# Patient Record
Sex: FEMALE | Race: WHITE | NOT HISPANIC OR LATINO | Employment: OTHER | URBAN - NONMETROPOLITAN AREA
[De-identification: names, ages, dates, MRNs, and addresses within clinical notes are randomized per-mention and may not be internally consistent; named-entity substitution may affect disease eponyms.]

---

## 2017-02-03 ENCOUNTER — OFFICE VISIT (OUTPATIENT)
Dept: CARDIOLOGY | Facility: CLINIC | Age: 82
End: 2017-02-03
Payer: MEDICARE

## 2017-02-03 VITALS
HEIGHT: 60 IN | HEART RATE: 70 BPM | BODY MASS INDEX: 38.28 KG/M2 | SYSTOLIC BLOOD PRESSURE: 100 MMHG | OXYGEN SATURATION: 95 % | DIASTOLIC BLOOD PRESSURE: 50 MMHG | WEIGHT: 195 LBS

## 2017-02-03 DIAGNOSIS — I10 ESSENTIAL HYPERTENSION, BENIGN: ICD-10-CM

## 2017-02-03 DIAGNOSIS — I35.0 NONRHEUMATIC AORTIC VALVE STENOSIS: ICD-10-CM

## 2017-02-03 DIAGNOSIS — E78.2 MIXED HYPERLIPIDEMIA: ICD-10-CM

## 2017-02-03 DIAGNOSIS — I50.32 CHRONIC DIASTOLIC HEART FAILURE (HCC): ICD-10-CM

## 2017-02-03 DIAGNOSIS — I25.10 ATHEROSCLEROSIS OF NATIVE CORONARY ARTERY OF NATIVE HEART WITHOUT ANGINA PECTORIS: ICD-10-CM

## 2017-02-03 PROCEDURE — G8484 FLU IMMUNIZE NO ADMIN: HCPCS | Performed by: INTERNAL MEDICINE

## 2017-02-03 PROCEDURE — 1101F PT FALLS ASSESS-DOCD LE1/YR: CPT | Mod: 8P | Performed by: INTERNAL MEDICINE

## 2017-02-03 PROCEDURE — 4040F PNEUMOC VAC/ADMIN/RCVD: CPT | Mod: 8P | Performed by: INTERNAL MEDICINE

## 2017-02-03 PROCEDURE — 1036F TOBACCO NON-USER: CPT | Performed by: INTERNAL MEDICINE

## 2017-02-03 PROCEDURE — G8432 DEP SCR NOT DOC, RNG: HCPCS | Performed by: INTERNAL MEDICINE

## 2017-02-03 PROCEDURE — G8419 CALC BMI OUT NRM PARAM NOF/U: HCPCS | Performed by: INTERNAL MEDICINE

## 2017-02-03 PROCEDURE — 99213 OFFICE O/P EST LOW 20 MIN: CPT | Performed by: INTERNAL MEDICINE

## 2017-02-03 PROCEDURE — G8598 ASA/ANTIPLAT THER USED: HCPCS | Performed by: INTERNAL MEDICINE

## 2017-02-03 RX ORDER — ATORVASTATIN CALCIUM 20 MG/1
20 TABLET, FILM COATED ORAL DAILY
Qty: 90 TAB | Refills: 3
Start: 2017-02-03

## 2017-02-03 RX ORDER — ATORVASTATIN CALCIUM 20 MG/1
TABLET, FILM COATED ORAL
COMMUNITY
Start: 2017-01-12 | End: 2017-02-03 | Stop reason: SDUPTHER

## 2017-02-03 RX ORDER — POTASSIUM CHLORIDE 1500 MG/1
20 TABLET, FILM COATED, EXTENDED RELEASE ORAL DAILY
COMMUNITY
Start: 2017-01-01 | End: 2017-02-03

## 2017-02-03 ASSESSMENT — ENCOUNTER SYMPTOMS
ORTHOPNEA: 1
COUGH: 0
PND: 0
SHORTNESS OF BREATH: 1
WHEEZING: 0
FALLS: 1
SPUTUM PRODUCTION: 0
HEMOPTYSIS: 0

## 2017-02-03 ASSESSMENT — LIFESTYLE VARIABLES: SUBSTANCE_ABUSE: 0

## 2017-02-03 NOTE — PROGRESS NOTES
Subjective:   Sharron Bateman is a 89 y.o. female who presents today for follow-up of her effort dyspnea associated with her valve disease and hypertension. It's hard to know if a transient period of higher diuretic dose improve this or not and also how much of it is pulmonary and she and Dr. Jon are working on that now    Past Medical History   Diagnosis Date   • Arthritis      hands   • Hypertension    • Nephrolithiasis    • Myocardial infarct (CMS-HCC) 2009   • Acquired hypothyroidism    • Coronary artery disease 2009     History of BMS to RCA & LAD in 9/2009   • Presence of permanent cardiac pacemaker 4/22/2011     1. Pulse generator,  [employer] model #S203, serial  #[id number].  2. Right atrial lead,  Guidant model #4469, serial #[id number].  3. Right ventricular lead,  Guidant model #4456, serial  #[id number].     • HLD (hyperlipidemia)    • Permanent atrial fibrillation (CMS-[facility])    • Long term (current) use of anticoagulants    • Left ventricular hypertrophy 2016   • Aortic stenosis 2016     RIGOBERTO=1.0cm2   • Mild mitral regurgitation 2016   • Pulmonary hypertension (CMS-[facility]) 2016     Moderate   • TIA involving right internal carotid artery 3/22/2016     Anticoagulation transiently subtherapeutic   • Shoulder dislocation 2016     Right, successfully reduced     Past Surgical History   Procedure Laterality Date   • Other orthopedic surgery       left knee replacement   • Other  2009     BMS to RCA & LAD    • Other       parathyroid removed   • Recovery  4/21/2011     Performed by SURGERY, IR-RECOVERY at SURGERY SAME DAY [facility] ORS   • Pacemaker insertion       Family History   Problem Relation Age of Onset   • Heart Disease Mother    • Heart Attack Father    • Heart Disease Father      CORONARY THROMBOSIS.     History   Smoking status   • Never Smoker    Smokeless tobacco   • Never Used     Allergies   Allergen Reactions   • Sulfa Drugs      Outpatient Encounter  Prescriptions as of 2/3/2017   Medication Sig Dispense Refill   • atorvastatin (LIPITOR) 20 MG Tab Take 1 Tab by mouth every day. 90 Tab 3   • nystatin (MYCOSTATIN) powder Apply  to affected area(s) 4 times a day.     • lisinopril (PRINIVIL) 20 MG TABS Take 20 mg by mouth 2 times a day.     • metoprolol SR (TOPROL XL) 50 MG TB24 Take 50 mg by mouth every day.     • potassium chloride SA (K-DUR) 20 MEQ TBCR Take 20 mEq by mouth every day.     • warfarin (COUMADIN) 2 MG TABS Take 2 mg by mouth every day.     • aspirin 81 MG tablet Take 81 mg by mouth every day.       • furosemide (LASIX) 20 MG TABS Take 20 mg by mouth. 2 TABS IN AM; 1 TAB AT NOON     • amlodipine (NORVASC) 5 MG TABS Take 5 mg by mouth every day.       • Acetaminophen (TYLENOL ARTHRITIS EXT RELIEF PO) Take  by mouth.     • Calcium Carbonate-Vitamin D (CALCIUM 500 + D PO) Take  by mouth.     • [DISCONTINUED] atorvastatin (LIPITOR) 20 MG Tab      • [DISCONTINUED] K-TAB 20 MEQ Tab CR tablet Take 20 mEq by mouth every day.     • [DISCONTINUED] atorvastatin (LIPITOR) 40 MG TABS Take 1 Tab by mouth every evening. 90 Each 3     No facility-administered encounter medications on file as of 2/3/2017.     Review of Systems   Respiratory: Positive for shortness of breath. Negative for cough, hemoptysis, sputum production and wheezing.    Cardiovascular: Positive for orthopnea. Negative for PND.   Musculoskeletal: Positive for falls (she tripped over her oxygen ).   Psychiatric/Behavioral: Negative for substance abuse.        Objective:   /50 mmHg  Pulse 70  Ht 1.524 m (5')  Wt 88.451 kg (195 lb)  BMI 38.08 kg/m2  SpO2 95%    Physical Exam   Constitutional: She is oriented to person, place, and time. She appears well-developed and well-nourished. No distress.   She is in a wheelchair today and on her nasal O2. She is still much younger than her age.   Eyes: Conjunctivae are normal. No scleral icterus.   Neck: No JVD present.   Cardiovascular: Normal rate  and regular rhythm.  Exam reveals no gallop.    Murmur (there is a grade 2/6 systolic murmur noted at the base) heard.  Pulmonary/Chest: Effort normal. She has rales (Both bases).   Musculoskeletal: She exhibits edema (1+).   Neurological: She is alert and oriented to person, place, and time.   No residual neurological deficit.   Skin: Skin is warm and dry. She is not diaphoretic.   Psychiatric: She has a normal mood and affect. Thought content normal.       Assessment:     1. Atherosclerosis of native coronary artery of native heart without angina pectoris     2. Essential hypertension, benign     3. Chronic diastolic heart failure (CMS-HCC)     4. Nonrheumatic aortic valve stenosis     5. Mixed hyperlipidemia  atorvastatin (LIPITOR) 20 MG Tab     The above assessed cardiovascular problems are clinically stable except that it is not clear how much her dyspnea is caused by heart failure and how much due to other factors such as oxygen-dependent lung disease.  Medical Decision Making:  Today's Assessment / Status / Plan:     Try the furosemide at 40 mg twice daily for 2 weeks at least with follow-up with Dr. Jon and lab just before that visit  (see scanned requisition).  I will see her in follow-up after her evaluation by Dr. Jon.

## 2017-02-03 NOTE — Clinical Note
[facility]   [address] [place] [address]  Phone: [phone]  Fax: [phone]              Sharron Bateman  10/23/1923    Encounter Date: 2/3/2017    Yong Corbett M.D.          PROGRESS NOTE:  Subjective:   Sharron Bateman is a 89 y.o. female who presents today for follow-up of her effort dyspnea associated with her valve disease and hypertension. It's hard to know if a transient period of higher diuretic dose improve this or not and also how much of it is pulmonary and she and Dr. Jon are working on that now    Past Medical History   Diagnosis Date   • Arthritis      hands   • Hypertension    • Nephrolithiasis    • Myocardial infarct (CMS-HCC) 2009   • Acquired hypothyroidism    • Coronary artery disease 2009     History of BMS to RCA & LAD in 9/2009   • Presence of permanent cardiac pacemaker 4/22/2011     1. Pulse generator,  [employer] model #S203, serial  #[id number].  2. Right atrial lead,  Guidant model #4469, serial #[id number].  3. Right ventricular lead,  Guidant model #4456, serial  #[id number].     • HLD (hyperlipidemia)    • Permanent atrial fibrillation (CMS-HCC)    • Long term (current) use of anticoagulants    • Left ventricular hypertrophy 2016   • Aortic stenosis 2016     RIGOBERTO=1.0cm2   • Mild mitral regurgitation 2016   • Pulmonary hypertension (CMS-HCC) 2016     Moderate   • TIA involving right internal carotid artery 3/22/2016     Anticoagulation transiently subtherapeutic   • Shoulder dislocation 2016     Right, successfully reduced     Past Surgical History   Procedure Laterality Date   • Other orthopedic surgery       left knee replacement   • Other  2009     BMS to RCA & LAD    • Other       parathyroid removed   • Recovery  4/21/2011     Performed by SURGERY, IR-RECOVERY at SURGERY SAME DAY [facility] ORS   • Pacemaker insertion       Family History   Problem Relation Age of  Onset   • Heart Disease Mother    • Heart Attack Father    • Heart Disease Father      CORONARY THROMBOSIS.     History   Smoking status   • Never Smoker    Smokeless tobacco   • Never Used     Allergies   Allergen Reactions   • Sulfa Drugs      Outpatient Encounter Prescriptions as of 2/3/2017   Medication Sig Dispense Refill   • atorvastatin (LIPITOR) 20 MG Tab Take 1 Tab by mouth every day. 90 Tab 3   • nystatin (MYCOSTATIN) powder Apply  to affected area(s) 4 times a day.     • lisinopril (PRINIVIL) 20 MG TABS Take 20 mg by mouth 2 times a day.     • metoprolol SR (TOPROL XL) 50 MG TB24 Take 50 mg by mouth every day.     • potassium chloride SA (K-DUR) 20 MEQ TBCR Take 20 mEq by mouth every day.     • warfarin (COUMADIN) 2 MG TABS Take 2 mg by mouth every day.     • aspirin 81 MG tablet Take 81 mg by mouth every day.       • furosemide (LASIX) 20 MG TABS Take 20 mg by mouth. 2 TABS IN AM; 1 TAB AT NOON     • amlodipine (NORVASC) 5 MG TABS Take 5 mg by mouth every day.       • Acetaminophen (TYLENOL ARTHRITIS EXT RELIEF PO) Take  by mouth.     • Calcium Carbonate-Vitamin D (CALCIUM 500 + D PO) Take  by mouth.     • [DISCONTINUED] atorvastatin (LIPITOR) 20 MG Tab      • [DISCONTINUED] K-TAB 20 MEQ Tab CR tablet Take 20 mEq by mouth every day.     • [DISCONTINUED] atorvastatin (LIPITOR) 40 MG TABS Take 1 Tab by mouth every evening. 90 Each 3     No facility-administered encounter medications on file as of 2/3/2017.     Review of Systems   Respiratory: Positive for shortness of breath. Negative for cough, hemoptysis, sputum production and wheezing.    Cardiovascular: Positive for orthopnea. Negative for PND.   Musculoskeletal: Positive for falls (she tripped over her oxygen ).   Psychiatric/Behavioral: Negative for substance abuse.        Objective:   /50 mmHg  Pulse 70  Ht 1.524 m (5')  Wt 88.451 kg (195 lb)  BMI 38.08 kg/m2  SpO2 95%    Physical Exam   Constitutional: She is oriented to person, place,  and time. She appears well-developed and well-nourished. No distress.   She is in a wheelchair today and on her nasal O2. She is still much younger than her age.   Eyes: Conjunctivae are normal. No scleral icterus.   Neck: No JVD present.   Cardiovascular: Normal rate and regular rhythm.  Exam reveals no gallop.    Murmur (there is a grade 2/6 systolic murmur noted at the base) heard.  Pulmonary/Chest: Effort normal. She has rales (Both bases).   Musculoskeletal: She exhibits edema (1+).   Neurological: She is alert and oriented to person, place, and time.   No residual neurological deficit.   Skin: Skin is warm and dry. She is not diaphoretic.   Psychiatric: She has a normal mood and affect. Thought content normal.       Assessment:     1. Atherosclerosis of native coronary artery of native heart without angina pectoris     2. Essential hypertension, benign     3. Chronic diastolic heart failure (CMS-HCC)     4. Nonrheumatic aortic valve stenosis     5. Mixed hyperlipidemia  atorvastatin (LIPITOR) 20 MG Tab     The above assessed cardiovascular problems are clinically stable except that it is not clear how much her dyspnea is caused by heart failure and how much due to other factors such as oxygen-dependent lung disease.  Medical Decision Making:  Today's Assessment / Status / Plan:     Try the furosemide at 40 mg twice daily for 2 weeks at least with follow-up with Dr. Jon and lab just before that visit  (see scanned requisition).  I will see her in follow-up after her evaluation by Dr. Jon.        No Recipients

## 2017-05-26 ENCOUNTER — DEVICE CHECK (OUTPATIENT)
Dept: CARDIOLOGY | Facility: MEDICAL CENTER | Age: 82
End: 2017-05-26

## 2017-06-23 ENCOUNTER — OFFICE VISIT (OUTPATIENT)
Dept: CARDIOLOGY | Facility: CLINIC | Age: 82
End: 2017-06-23
Payer: MEDICARE

## 2017-06-23 VITALS
WEIGHT: 195 LBS | HEIGHT: 60 IN | SYSTOLIC BLOOD PRESSURE: 100 MMHG | DIASTOLIC BLOOD PRESSURE: 70 MMHG | HEART RATE: 76 BPM | OXYGEN SATURATION: 90 % | BODY MASS INDEX: 38.28 KG/M2

## 2017-06-23 DIAGNOSIS — I25.10 ATHEROSCLEROSIS OF NATIVE CORONARY ARTERY OF NATIVE HEART WITHOUT ANGINA PECTORIS: ICD-10-CM

## 2017-06-23 DIAGNOSIS — I49.5 SICK SINUS SYNDROME (HCC): ICD-10-CM

## 2017-06-23 DIAGNOSIS — I35.0 NONRHEUMATIC AORTIC VALVE STENOSIS: ICD-10-CM

## 2017-06-23 DIAGNOSIS — G45.1 TIA INVOLVING RIGHT INTERNAL CAROTID ARTERY: ICD-10-CM

## 2017-06-23 DIAGNOSIS — E78.2 MIXED HYPERLIPIDEMIA: ICD-10-CM

## 2017-06-23 DIAGNOSIS — Z79.01 CHRONIC ANTICOAGULATION: ICD-10-CM

## 2017-06-23 DIAGNOSIS — I10 ESSENTIAL HYPERTENSION, BENIGN: ICD-10-CM

## 2017-06-23 DIAGNOSIS — I48.21 PERMANENT ATRIAL FIBRILLATION (HCC): ICD-10-CM

## 2017-06-23 PROCEDURE — 99215 OFFICE O/P EST HI 40 MIN: CPT | Performed by: INTERNAL MEDICINE

## 2017-06-23 NOTE — Clinical Note
[facility] [place]   [address] [place] [address]  Phone: [phone]  Fax: [phone]              Sharron Bateman  10/23/1923    Encounter Date: 6/23/2017    Shane Jeffries M.D.          PROGRESS NOTE:  Subjective:   Sharron Bateman is a 89 y.o. female who presents today for followup for her exertional SOB.  Her echo showed an RIGOBERTO of 1.0 and her murmur lacks an A2.  She gets very fatigued with mild SOB, likely NYHA IV symtpoms.  She also is weakened and cannot get up from from laying down.  She also appears to have some degree of dementia.  She is taking lasix 40 BID as well as aspirin and a DOAC for her atrial fibrillation.  She was having issues with bleeding gums and stopped her ASA for one week but her facility has restarted it.  Her daughter is worried about her frailty.    Past Medical History   Diagnosis Date   • Arthritis      hands   • Hypertension    • Nephrolithiasis    • Myocardial infarct (CMS-HCC) 2009   • Acquired hypothyroidism    • Coronary artery disease 2009     History of BMS to RCA & LAD in 9/2009   • Presence of permanent cardiac pacemaker 4/22/2011     1. Pulse generator,  [employer] model #S203, serial  #[id number].  2. Right atrial lead,  Guidant model #4469, serial #[id number].  3. Right ventricular lead,  Guidant model #4456, serial  #[id number].     • HLD (hyperlipidemia)    • Permanent atrial fibrillation (CMS-HCC)    • Long term (current) use of anticoagulants    • Left ventricular hypertrophy 2016   • Aortic stenosis 2016     RIGOBERTO=1.0cm2   • Mild mitral regurgitation 2016   • Pulmonary hypertension (CMS-HCC) 2016     Moderate   • TIA involving right internal carotid artery 3/22/2016     Anticoagulation transiently subtherapeutic   • Shoulder dislocation 2016     Right, successfully reduced     Past Surgical History   Procedure Laterality Date   • Other orthopedic surgery      left knee replacement   • Other  2009     BMS to RCA & LAD    • Other       parathyroid removed   • Recovery  4/21/2011     Performed by SURGERY, IR-RECOVERY at SURGERY SAME DAY [facility] ORS   • Pacemaker insertion       Family History   Problem Relation Age of Onset   • Heart Disease Mother    • Heart Attack Father    • Heart Disease Father      CORONARY THROMBOSIS.     History   Smoking status   • Never Smoker    Smokeless tobacco   • Never Used     Allergies   Allergen Reactions   • Sulfa Drugs      Outpatient Encounter Prescriptions as of 6/23/2017   Medication Sig Dispense Refill   • atorvastatin (LIPITOR) 20 MG Tab Take 1 Tab by mouth every day. 90 Tab 3   • nystatin (MYCOSTATIN) powder Apply  to affected area(s) 4 times a day.     • lisinopril (PRINIVIL) 20 MG TABS Take 20 mg by mouth 2 times a day.     • metoprolol SR (TOPROL XL) 50 MG TB24 Take 50 mg by mouth every day.     • potassium chloride SA (K-DUR) 20 MEQ TBCR Take 20 mEq by mouth every day.     • warfarin (COUMADIN) 2 MG TABS Take 2 mg by mouth every day.     • aspirin 81 MG tablet Take 81 mg by mouth every day.       • furosemide (LASIX) 20 MG TABS Take 20 mg by mouth. 2 TABS IN AM; 1 TAB AT NOON     • amlodipine (NORVASC) 5 MG TABS Take 5 mg by mouth every day.       • Acetaminophen (TYLENOL ARTHRITIS EXT RELIEF PO) Take  by mouth.     • Calcium Carbonate-Vitamin D (CALCIUM 500 + D PO) Take  by mouth.       No facility-administered encounter medications on file as of 6/23/2017.     Review of Systems   Constitutional: Positive for malaise/fatigue. Negative for fever and chills.   HENT: Negative.  Negative for sore throat.    Eyes: Negative.    Respiratory: Positive for shortness of breath. Negative for cough, hemoptysis, sputum production, wheezing and stridor.    Cardiovascular: Negative.  Negative for chest pain, palpitations, orthopnea, claudication, leg swelling and PND.   Gastrointestinal: Negative.    Genitourinary: Negative.       Musculoskeletal: Negative.    Skin: Negative.    Neurological: Positive for weakness. Negative for dizziness and loss of consciousness.   Endo/Heme/Allergies: Negative.  Does not bruise/bleed easily.   All other systems reviewed and are negative.       Objective:   /70 mmHg  Pulse 76  Ht 1.524 m (5')  Wt 88.451 kg (195 lb)  BMI 38.08 kg/m2  SpO2 90%    Physical Exam   Constitutional: She is oriented to person, place, and time. She appears well-developed and well-nourished. No distress.   HENT:   Head: Normocephalic.   Mouth/Throat: Oropharynx is clear and moist.   Eyes: EOM are normal. Pupils are equal, round, and reactive to light. Right eye exhibits no discharge. Left eye exhibits no discharge. No scleral icterus.   Neck: Normal range of motion. Neck supple. No JVD present. No tracheal deviation present.   Cardiovascular: Normal rate, regular rhythm, S1 normal, S2 normal, intact distal pulses and normal pulses.  Exam reveals no gallop, no S3, no S4 and no friction rub.    Murmur heard.   No systolic murmur is present    No diastolic murmur is present   Pulses:       Carotid pulses are 2+ on the right side, and 2+ on the left side.       Radial pulses are 2+ on the right side, and 2+ on the left side.        Dorsalis pedis pulses are 2+ on the right side, and 2+ on the left side.        Posterior tibial pulses are 2+ on the right side, and 2+ on the left side.   Pulmonary/Chest: Effort normal and breath sounds normal. No respiratory distress. She has no wheezes. She has no rales.   Abdominal: Soft. Bowel sounds are normal. She exhibits no distension and no mass. There is no tenderness. There is no rebound and no guarding.   Musculoskeletal: She exhibits no edema.   Neurological: She is alert and oriented to person, place, and time. No cranial nerve deficit.   Skin: Skin is warm and dry. She is not diaphoretic. No pallor.   Psychiatric: She has a normal mood and affect. Her behavior is normal. Judgment  and thought content normal.   Nursing note and vitals reviewed.      Assessment:     1. Essential hypertension, benign     2. Sick sinus syndrome (CMS-HCC)     3. Nonrheumatic aortic valve stenosis     4. Atherosclerosis of native coronary artery of native heart without angina pectoris     5. Mixed hyperlipidemia     6. Permanent atrial fibrillation (CMS-HCC)     7. Chronic anticoagulation     8. TIA involving right internal carotid artery         Medical Decision Making:  Today's Assessment / Status / Plan:     89 y/o F with likely severe AS and permanent atrial fibrillation.  I have written an order for her to stop the aspirin but keep the other anticoagulant given her bleeding gums.  I have discussed with her daughter and her that this likely represents the end stages of severe AS. WE can palliate her with lasix to keep her from having symptomatic LE edema and some SOB but we will be very limited with this.  I also discussed the treatment would entail open heart surgery and/or TAVR, both of which the patient and her daughter do not want.  Her PCP should consider a palliative care consult/Hospice.    Thank for you allowing me to take part in your patient's care, please call should you have any questions or would like to discuss this patient.      Arabella Jon M.D.  [address] [facility] [address]  VIA Facsimile: [phone]

## 2017-06-24 ASSESSMENT — ENCOUNTER SYMPTOMS
SORE THROAT: 0
CLAUDICATION: 0
PALPITATIONS: 0
WHEEZING: 0
MUSCULOSKELETAL NEGATIVE: 1
COUGH: 0
LOSS OF CONSCIOUSNESS: 0
GASTROINTESTINAL NEGATIVE: 1
CARDIOVASCULAR NEGATIVE: 1
CHILLS: 0
PND: 0
SHORTNESS OF BREATH: 1
SPUTUM PRODUCTION: 0
DIZZINESS: 0
EYES NEGATIVE: 1
BRUISES/BLEEDS EASILY: 0
ORTHOPNEA: 0
FEVER: 0
STRIDOR: 0
HEMOPTYSIS: 0
WEAKNESS: 1

## 2017-06-24 NOTE — PROGRESS NOTES
Subjective:   Sharron Bateman is a 89 y.o. female who presents today for followup for her exertional SOB.  Her echo showed an RIGOBERTO of 1.0 and her murmur lacks an A2.  She gets very fatigued with mild SOB, likely NYHA IV symtpoms.  She also is weakened and cannot get up from from laying down.  She also appears to have some degree of dementia.  She is taking lasix 40 BID as well as aspirin and a DOAC for her atrial fibrillation.  She was having issues with bleeding gums and stopped her ASA for one week but her facility has restarted it.  Her daughter is worried about her frailty.    Past Medical History   Diagnosis Date   • Arthritis      hands   • Hypertension    • Nephrolithiasis    • Myocardial infarct (CMS-HCC) 2009   • Acquired hypothyroidism    • Coronary artery disease 2009     History of BMS to RCA & LAD in 9/2009   • Presence of permanent cardiac pacemaker 4/22/2011     1. Pulse generator,  [employer] model #S203, serial  #[id number].  2. Right atrial lead,  Guidant model #4469, serial #[id number].  3. Right ventricular lead,  Guidant model #4456, serial  #[id number].     • HLD (hyperlipidemia)    • Permanent atrial fibrillation (CMS-HCC)    • Long term (current) use of anticoagulants    • Left ventricular hypertrophy 2016   • Aortic stenosis 2016     RIGOBERTO=1.0cm2   • Mild mitral regurgitation 2016   • Pulmonary hypertension (CMS-HCC) 2016     Moderate   • TIA involving right internal carotid artery 3/22/2016     Anticoagulation transiently subtherapeutic   • Shoulder dislocation 2016     Right, successfully reduced     Past Surgical History   Procedure Laterality Date   • Other orthopedic surgery       left knee replacement   • Other  2009     BMS to RCA & LAD    • Other       parathyroid removed   • Recovery  4/21/2011     Performed by SURGERY, IR-RECOVERY at SURGERY SAME DAY [facility] ORS   • Pacemaker insertion       Family History   Problem Relation Age of Onset   • Heart  Disease Mother    • Heart Attack Father    • Heart Disease Father      CORONARY THROMBOSIS.     History   Smoking status   • Never Smoker    Smokeless tobacco   • Never Used     Allergies   Allergen Reactions   • Sulfa Drugs      Outpatient Encounter Prescriptions as of 6/23/2017   Medication Sig Dispense Refill   • atorvastatin (LIPITOR) 20 MG Tab Take 1 Tab by mouth every day. 90 Tab 3   • nystatin (MYCOSTATIN) powder Apply  to affected area(s) 4 times a day.     • lisinopril (PRINIVIL) 20 MG TABS Take 20 mg by mouth 2 times a day.     • metoprolol SR (TOPROL XL) 50 MG TB24 Take 50 mg by mouth every day.     • potassium chloride SA (K-DUR) 20 MEQ TBCR Take 20 mEq by mouth every day.     • warfarin (COUMADIN) 2 MG TABS Take 2 mg by mouth every day.     • aspirin 81 MG tablet Take 81 mg by mouth every day.       • furosemide (LASIX) 20 MG TABS Take 20 mg by mouth. 2 TABS IN AM; 1 TAB AT NOON     • amlodipine (NORVASC) 5 MG TABS Take 5 mg by mouth every day.       • Acetaminophen (TYLENOL ARTHRITIS EXT RELIEF PO) Take  by mouth.     • Calcium Carbonate-Vitamin D (CALCIUM 500 + D PO) Take  by mouth.       No facility-administered encounter medications on file as of 6/23/2017.     Review of Systems   Constitutional: Positive for malaise/fatigue. Negative for fever and chills.   HENT: Negative.  Negative for sore throat.    Eyes: Negative.    Respiratory: Positive for shortness of breath. Negative for cough, hemoptysis, sputum production, wheezing and stridor.    Cardiovascular: Negative.  Negative for chest pain, palpitations, orthopnea, claudication, leg swelling and PND.   Gastrointestinal: Negative.    Genitourinary: Negative.    Musculoskeletal: Negative.    Skin: Negative.    Neurological: Positive for weakness. Negative for dizziness and loss of consciousness.   Endo/Heme/Allergies: Negative.  Does not bruise/bleed easily.   All other systems reviewed and are negative.       Objective:   /70 mmHg  Pulse 76   Ht 1.524 m (5')  Wt 88.451 kg (195 lb)  BMI 38.08 kg/m2  SpO2 90%    Physical Exam   Constitutional: She is oriented to person, place, and time. She appears well-developed and well-nourished. No distress.   HENT:   Head: Normocephalic.   Mouth/Throat: Oropharynx is clear and moist.   Eyes: EOM are normal. Pupils are equal, round, and reactive to light. Right eye exhibits no discharge. Left eye exhibits no discharge. No scleral icterus.   Neck: Normal range of motion. Neck supple. No JVD present. No tracheal deviation present.   Cardiovascular: Normal rate, regular rhythm, S1 normal, S2 normal, intact distal pulses and normal pulses.  Exam reveals no gallop, no S3, no S4 and no friction rub.    Murmur heard.   No systolic murmur is present    No diastolic murmur is present   Pulses:       Carotid pulses are 2+ on the right side, and 2+ on the left side.       Radial pulses are 2+ on the right side, and 2+ on the left side.        Dorsalis pedis pulses are 2+ on the right side, and 2+ on the left side.        Posterior tibial pulses are 2+ on the right side, and 2+ on the left side.   Pulmonary/Chest: Effort normal and breath sounds normal. No respiratory distress. She has no wheezes. She has no rales.   Abdominal: Soft. Bowel sounds are normal. She exhibits no distension and no mass. There is no tenderness. There is no rebound and no guarding.   Musculoskeletal: She exhibits no edema.   Neurological: She is alert and oriented to person, place, and time. No cranial nerve deficit.   Skin: Skin is warm and dry. She is not diaphoretic. No pallor.   Psychiatric: She has a normal mood and affect. Her behavior is normal. Judgment and thought content normal.   Nursing note and vitals reviewed.      Assessment:     1. Essential hypertension, benign     2. Sick sinus syndrome (CMS-HCC)     3. Nonrheumatic aortic valve stenosis     4. Atherosclerosis of native coronary artery of native heart without angina pectoris     5.  Mixed hyperlipidemia     6. Permanent atrial fibrillation (CMS-HCC)     7. Chronic anticoagulation     8. TIA involving right internal carotid artery         Medical Decision Making:  Today's Assessment / Status / Plan:     89 y/o F with likely severe AS and permanent atrial fibrillation.  I have written an order for her to stop the aspirin but keep the other anticoagulant given her bleeding gums.  I have discussed with her daughter and her that this likely represents the end stages of severe AS. WE can palliate her with lasix to keep her from having symptomatic LE edema and some SOB but we will be very limited with this.  I also discussed the treatment would entail open heart surgery and/or TAVR, both of which the patient and her daughter do not want.  Her PCP should consider a palliative care consult/Hospice.    Thank for you allowing me to take part in your patient's care, please call should you have any questions or would like to discuss this patient.

## 2017-09-08 ENCOUNTER — OFFICE VISIT (OUTPATIENT)
Dept: CARDIOLOGY | Facility: CLINIC | Age: 82
End: 2017-09-08
Payer: MEDICARE

## 2017-09-08 VITALS
DIASTOLIC BLOOD PRESSURE: 80 MMHG | OXYGEN SATURATION: 96 % | BODY MASS INDEX: 37.69 KG/M2 | SYSTOLIC BLOOD PRESSURE: 136 MMHG | WEIGHT: 192 LBS | HEIGHT: 60 IN | HEART RATE: 70 BPM

## 2017-09-08 DIAGNOSIS — I10 ESSENTIAL HYPERTENSION, BENIGN: ICD-10-CM

## 2017-09-08 DIAGNOSIS — Z79.01 CHRONIC ANTICOAGULATION: ICD-10-CM

## 2017-09-08 DIAGNOSIS — I35.0 NONRHEUMATIC AORTIC VALVE STENOSIS: ICD-10-CM

## 2017-09-08 DIAGNOSIS — I48.21 PERMANENT ATRIAL FIBRILLATION (HCC): ICD-10-CM

## 2017-09-08 DIAGNOSIS — I50.32 CHRONIC DIASTOLIC (CONGESTIVE) HEART FAILURE (HCC): ICD-10-CM

## 2017-09-08 PROCEDURE — 99214 OFFICE O/P EST MOD 30 MIN: CPT | Performed by: INTERNAL MEDICINE

## 2017-09-08 RX ORDER — FUROSEMIDE 40 MG/1
80 TABLET ORAL 2 TIMES DAILY
Qty: 360 TAB | Refills: 3 | Status: SHIPPED | OUTPATIENT
Start: 2017-09-08

## 2017-09-08 ASSESSMENT — ENCOUNTER SYMPTOMS
BRUISES/BLEEDS EASILY: 0
HEMOPTYSIS: 0
PND: 0
ORTHOPNEA: 1

## 2017-09-08 NOTE — LETTER
[facility]   [address] [place] [address]  Phone: [phone]  Fax: [phone]              Sharron Bateman  10/23/1923    Encounter Date: 9/8/2017    Yong Corbett M.D.          PROGRESS NOTE:  Subjective:   Sharron Bateman is a 89 y.o. female who presents today For follow-up of heart failure.  She is getting progressively weaker and more disabled from effort dyspnea. She doesn't like taking the diuretic because of the more frequent voiding. As previously discussed she does not want to consider invasive valvular intervention.      Past Medical History:   Diagnosis Date   • TIA involving right internal carotid artery 3/22/2016    Anticoagulation transiently subtherapeutic   • Left ventricular hypertrophy 2016   • Aortic stenosis 2016    RIGOBERTO=1.0cm2   • Mild mitral regurgitation 2016   • Pulmonary hypertension (CMS-HCC) 2016    Moderate   • Shoulder dislocation 2016    Right, successfully reduced   • Presence of permanent cardiac pacemaker 4/22/2011    1. Pulse generator,  [employer] model #S203, serial  #[id number].  2. Right atrial lead,  Guidant model #4469, serial #[id number].  3. Right ventricular lead,  Guidant model #4456, serial  #[id number].     • Myocardial infarct (CMS-HCC) 2009   • Coronary artery disease 2009    History of BMS to RCA & LAD in 9/2009   • Acquired hypothyroidism    • Arthritis     hands   • HLD (hyperlipidemia)    • Hypertension    • Long term (current) use of anticoagulants    • Nephrolithiasis    • Permanent atrial fibrillation (CMS-HCC)      Past Surgical History:   Procedure Laterality Date   • RECOVERY  4/21/2011    Performed by SURGERY, IR-RECOVERY at SURGERY SAME DAY [facility] ORS   • OTHER  2009    BMS to RCA & LAD    • OTHER      parathyroid removed   • OTHER ORTHOPEDIC SURGERY      left knee replacement   • PACEMAKER INSERTION       Family History   Problem Relation Age  of Onset   • Heart Disease Mother    • Heart Attack Father    • Heart Disease Father      CORONARY THROMBOSIS.     History   Smoking Status   • Never Smoker   Smokeless Tobacco   • Never Used     Allergies   Allergen Reactions   • Sulfa Drugs      Outpatient Encounter Prescriptions as of 9/8/2017   Medication Sig Dispense Refill   • furosemide (LASIX) 40 MG Tab Take 2 Tabs by mouth 2 times a day. 360 Tab 3   • atorvastatin (LIPITOR) 20 MG Tab Take 1 Tab by mouth every day. 90 Tab 3   • nystatin (MYCOSTATIN) powder Apply  to affected area(s) 4 times a day.     • lisinopril (PRINIVIL) 20 MG TABS Take 20 mg by mouth 2 times a day.     • metoprolol SR (TOPROL XL) 50 MG TB24 Take 50 mg by mouth every day.     • potassium chloride SA (K-DUR) 20 MEQ TBCR Take 20 mEq by mouth every day.     • warfarin (COUMADIN) 2 MG TABS Take 2 mg by mouth every day.     • aspirin 81 MG tablet Take 81 mg by mouth every day.       • amlodipine (NORVASC) 5 MG TABS Take 5 mg by mouth every day.       • Acetaminophen (TYLENOL ARTHRITIS EXT RELIEF PO) Take  by mouth.     • Calcium Carbonate-Vitamin D (CALCIUM 500 + D PO) Take  by mouth.     • [DISCONTINUED] furosemide (LASIX) 20 MG TABS Take 20 mg by mouth. 2 TABS IN AM; 1 TAB AT NOON       No facility-administered encounter medications on file as of 9/8/2017.      Review of Systems   HENT: Negative for nosebleeds.    Respiratory: Negative for hemoptysis.    Cardiovascular: Positive for orthopnea and leg swelling. Negative for PND.   Endo/Heme/Allergies: Does not bruise/bleed easily.        Objective:   /80   Pulse 70   Ht 1.524 m (5')   Wt 87.1 kg (192 lb)   SpO2 96%   BMI 37.50 kg/m²      Physical Exam   Constitutional: She is oriented to person, place, and time. She appears well-developed and well-nourished. No distress.   She is in a wheelchair on  nasal O2. She is young for her age but appears more chronically ill than I have ever seen her   Eyes: Conjunctivae are normal. No  scleral icterus.   Neck: No JVD present.   Cardiovascular: Normal rate and regular rhythm.  Exam reveals no gallop.    Murmur (3/6 systolic murmur both sternal borders) heard.  Pulmonary/Chest: Effort normal. She has rales (Both bases).   Musculoskeletal: She exhibits edema (2+).   Neurological: She is alert and oriented to person, place, and time.   No residual neurological deficit.   Skin: Skin is warm and dry. She is not diaphoretic.   Psychiatric: She has a normal mood and affect. Her behavior is normal. Judgment and thought content normal.       Assessment:     1. Chronic diastolic (congestive) heart failure (CMS-[facility])  furosemide (LASIX) 40 MG Tab   2. Nonrheumatic aortic valve stenosis     3. Essential hypertension, benign     4. Permanent atrial fibrillation (CMS-HCC)     5. Chronic anticoagulation       Diastolic heart failure is progressing and she may be developing a component of systolic heart failure because her last echo was a a year and half ago. It has not been repeated because no further intervention is been planned. In March of last year echo showed an ejection fraction of 65% with moderate left ventricular hypertrophy and significant left atrial dilation.  Medical Decision Making:  Today's Assessment / Status / Plan:     She is still opposed to the idea of invasive evaluation or treatment of her aortic valve. We will try a higher dose of furosemide to 80 mg twice daily to see if that improves her dyspnea and is tolerable to her. If not she will probably back off.  Follow-up with me in 2 weeks.  She will see Dr. Jon in the interval and is due for lab data with that visit.  Dr. Jon will adjust her medical therapy further if required.  We are certainly reaching the stage of palliative care.      No Recipients

## 2017-09-09 NOTE — PROGRESS NOTES
Subjective:   Sharron Bateman is a 89 y.o. female who presents today For follow-up of heart failure.  She is getting progressively weaker and more disabled from effort dyspnea. She doesn't like taking the diuretic because of the more frequent voiding. As previously discussed she does not want to consider invasive valvular intervention.      Past Medical History:   Diagnosis Date   • TIA involving right internal carotid artery 3/22/2016    Anticoagulation transiently subtherapeutic   • Left ventricular hypertrophy 2016   • Aortic stenosis 2016    RIGOBERTO=1.0cm2   • Mild mitral regurgitation 2016   • Pulmonary hypertension (CMS-[facility]) 2016    Moderate   • Shoulder dislocation 2016    Right, successfully reduced   • Presence of permanent cardiac pacemaker 4/22/2011    1. Pulse generator,  [employer] model #S203, serial  #[id number].  2. Right atrial lead,  Guidant model #4469, serial #[id number].  3. Right ventricular lead,  Guidant model #4456, serial  #[id number].     • Myocardial infarct (CMS-[facility]) 2009   • Coronary artery disease 2009    History of BMS to RCA & LAD in 9/2009   • Acquired hypothyroidism    • Arthritis     hands   • HLD (hyperlipidemia)    • Hypertension    • Long term (current) use of anticoagulants    • Nephrolithiasis    • Permanent atrial fibrillation (CMS-[facility])      Past Surgical History:   Procedure Laterality Date   • RECOVERY  4/21/2011    Performed by SURGERY, IR-RECOVERY at SURGERY SAME DAY [facility] ORS   • OTHER  2009    BMS to RCA & LAD    • OTHER      parathyroid removed   • OTHER ORTHOPEDIC SURGERY      left knee replacement   • PACEMAKER INSERTION       Family History   Problem Relation Age of Onset   • Heart Disease Mother    • Heart Attack Father    • Heart Disease Father      CORONARY THROMBOSIS.     History   Smoking Status   • Never Smoker   Smokeless Tobacco   • Never Used     Allergies   Allergen Reactions   • Sulfa Drugs      Outpatient Encounter  Prescriptions as of 9/8/2017   Medication Sig Dispense Refill   • furosemide (LASIX) 40 MG Tab Take 2 Tabs by mouth 2 times a day. 360 Tab 3   • atorvastatin (LIPITOR) 20 MG Tab Take 1 Tab by mouth every day. 90 Tab 3   • nystatin (MYCOSTATIN) powder Apply  to affected area(s) 4 times a day.     • lisinopril (PRINIVIL) 20 MG TABS Take 20 mg by mouth 2 times a day.     • metoprolol SR (TOPROL XL) 50 MG TB24 Take 50 mg by mouth every day.     • potassium chloride SA (K-DUR) 20 MEQ TBCR Take 20 mEq by mouth every day.     • warfarin (COUMADIN) 2 MG TABS Take 2 mg by mouth every day.     • aspirin 81 MG tablet Take 81 mg by mouth every day.       • amlodipine (NORVASC) 5 MG TABS Take 5 mg by mouth every day.       • Acetaminophen (TYLENOL ARTHRITIS EXT RELIEF PO) Take  by mouth.     • Calcium Carbonate-Vitamin D (CALCIUM 500 + D PO) Take  by mouth.     • [DISCONTINUED] furosemide (LASIX) 20 MG TABS Take 20 mg by mouth. 2 TABS IN AM; 1 TAB AT NOON       No facility-administered encounter medications on file as of 9/8/2017.      Review of Systems   HENT: Negative for nosebleeds.    Respiratory: Negative for hemoptysis.    Cardiovascular: Positive for orthopnea and leg swelling. Negative for PND.   Endo/Heme/Allergies: Does not bruise/bleed easily.        Objective:   /80   Pulse 70   Ht 1.524 m (5')   Wt 87.1 kg (192 lb)   SpO2 96%   BMI 37.50 kg/m²     Physical Exam   Constitutional: She is oriented to person, place, and time. She appears well-developed and well-nourished. No distress.   She is in a wheelchair on  nasal O2. She is young for her age but appears more chronically ill than I have ever seen her   Eyes: Conjunctivae are normal. No scleral icterus.   Neck: No JVD present.   Cardiovascular: Normal rate and regular rhythm.  Exam reveals no gallop.    Murmur (3/6 systolic murmur both sternal borders) heard.  Pulmonary/Chest: Effort normal. She has rales (Both bases).   Musculoskeletal: She exhibits  edema (2+).   Neurological: She is alert and oriented to person, place, and time.   No residual neurological deficit.   Skin: Skin is warm and dry. She is not diaphoretic.   Psychiatric: She has a normal mood and affect. Her behavior is normal. Judgment and thought content normal.       Assessment:     1. Chronic diastolic (congestive) heart failure (CMS-[facility])  furosemide (LASIX) 40 MG Tab   2. Nonrheumatic aortic valve stenosis     3. Essential hypertension, benign     4. Permanent atrial fibrillation (CMS-HCC)     5. Chronic anticoagulation       Diastolic heart failure is progressing and she may be developing a component of systolic heart failure because her last echo was a a year and half ago. It has not been repeated because no further intervention is been planned. In March of last year echo showed an ejection fraction of 65% with moderate left ventricular hypertrophy and significant left atrial dilation.  Medical Decision Making:  Today's Assessment / Status / Plan:     She is still opposed to the idea of invasive evaluation or treatment of her aortic valve. We will try a higher dose of furosemide to 80 mg twice daily to see if that improves her dyspnea and is tolerable to her. If not she will probably back off.  Follow-up with me in 2 weeks.  She will see Dr. Jon in the interval and is due for lab data with that visit.  Dr. Jon will adjust her medical therapy further if required.  We are certainly reaching the stage of palliative care.

## 2017-09-21 ENCOUNTER — OFFICE VISIT (OUTPATIENT)
Dept: CARDIOLOGY | Facility: CLINIC | Age: 82
End: 2017-09-21
Payer: MEDICARE

## 2017-09-21 VITALS
HEART RATE: 70 BPM | DIASTOLIC BLOOD PRESSURE: 70 MMHG | WEIGHT: 192 LBS | BODY MASS INDEX: 37.69 KG/M2 | OXYGEN SATURATION: 96 % | SYSTOLIC BLOOD PRESSURE: 100 MMHG | HEIGHT: 60 IN

## 2017-09-21 DIAGNOSIS — I50.32 CHRONIC DIASTOLIC HEART FAILURE (HCC): ICD-10-CM

## 2017-09-21 DIAGNOSIS — I48.21 PERMANENT ATRIAL FIBRILLATION (HCC): ICD-10-CM

## 2017-09-21 DIAGNOSIS — I35.0 NONRHEUMATIC AORTIC VALVE STENOSIS: ICD-10-CM

## 2017-09-21 DIAGNOSIS — I10 ESSENTIAL HYPERTENSION, BENIGN: ICD-10-CM

## 2017-09-21 DIAGNOSIS — I25.10 ATHEROSCLEROSIS OF NATIVE CORONARY ARTERY OF NATIVE HEART WITHOUT ANGINA PECTORIS: ICD-10-CM

## 2017-09-21 DIAGNOSIS — Z79.01 CHRONIC ANTICOAGULATION: ICD-10-CM

## 2017-09-21 PROCEDURE — 99213 OFFICE O/P EST LOW 20 MIN: CPT | Performed by: INTERNAL MEDICINE

## 2017-09-21 ASSESSMENT — ENCOUNTER SYMPTOMS
HEMOPTYSIS: 0
BLOOD IN STOOL: 0
BRUISES/BLEEDS EASILY: 0

## 2017-09-21 NOTE — PROGRESS NOTES
Subjective:   Sharron Bateman is a 89 y.o. female who presents today For follow-up of adjustment of her Lasix therapy. The presumptive diagnosis is diastolic heart failure but higher dose Lasix did not seem to make any difference in her dyspnea which is actually been reasonably controllable on 80 mg daily and therefore that has been continued. Otherwise she's had no clinical change.    Past Medical History:   Diagnosis Date   • TIA involving right internal carotid artery 3/22/2016    Anticoagulation transiently subtherapeutic   • Left ventricular hypertrophy 2016   • Aortic stenosis 2016    RIGOBERTO=1.0cm2   • Mild mitral regurgitation 2016   • Pulmonary hypertension (CMS-HCC) 2016    Moderate   • Shoulder dislocation 2016    Right, successfully reduced   • Presence of permanent cardiac pacemaker 4/22/2011    1. Pulse generator,  [employer] model #S203, serial  #[id number].  2. Right atrial lead,  Guidant model #4469, serial #[id number].  3. Right ventricular lead,  Guidant model #4456, serial  #[id number].     • Myocardial infarct (CMS-HCC) 2009   • Coronary artery disease 2009    History of BMS to RCA & LAD in 9/2009   • Acquired hypothyroidism    • Arthritis     hands   • HLD (hyperlipidemia)    • Hypertension    • Long term (current) use of anticoagulants    • Nephrolithiasis    • Permanent atrial fibrillation (CMS-HCC)      Past Surgical History:   Procedure Laterality Date   • RECOVERY  4/21/2011    Performed by SURGERY, IR-RECOVERY at SURGERY SAME DAY [facility] ORS   • OTHER  2009    BMS to RCA & LAD    • OTHER      parathyroid removed   • OTHER ORTHOPEDIC SURGERY      left knee replacement   • PACEMAKER INSERTION       Family History   Problem Relation Age of Onset   • Heart Disease Mother    • Heart Attack Father    • Heart Disease Father      CORONARY THROMBOSIS.     History   Smoking Status   • Never Smoker   Smokeless Tobacco   • Never Used     Allergies   Allergen Reactions    • Sulfa Drugs      Outpatient Encounter Prescriptions as of 9/21/2017   Medication Sig Dispense Refill   • furosemide (LASIX) 40 MG Tab Take 2 Tabs by mouth 2 times a day. 360 Tab 3   • atorvastatin (LIPITOR) 20 MG Tab Take 1 Tab by mouth every day. 90 Tab 3   • nystatin (MYCOSTATIN) powder Apply  to affected area(s) 4 times a day.     • lisinopril (PRINIVIL) 20 MG TABS Take 20 mg by mouth 2 times a day.     • metoprolol SR (TOPROL XL) 50 MG TB24 Take 50 mg by mouth every day.     • potassium chloride SA (K-DUR) 20 MEQ TBCR Take 20 mEq by mouth every day.     • warfarin (COUMADIN) 2 MG TABS Take 2 mg by mouth every day.     • aspirin 81 MG tablet Take 81 mg by mouth every day.       • amlodipine (NORVASC) 5 MG TABS Take 5 mg by mouth every day.       • Acetaminophen (TYLENOL ARTHRITIS EXT RELIEF PO) Take  by mouth.     • Calcium Carbonate-Vitamin D (CALCIUM 500 + D PO) Take  by mouth.       No facility-administered encounter medications on file as of 9/21/2017.      Review of Systems   HENT: Negative for nosebleeds.    Respiratory: Negative for hemoptysis.    Cardiovascular: Negative for chest pain.   Gastrointestinal: Negative for blood in stool and melena.   Genitourinary: Negative for hematuria.   Endo/Heme/Allergies: Does not bruise/bleed easily.        Objective:   /70   Pulse 70   Ht 1.524 m (5')   Wt 87.1 kg (192 lb)   SpO2 96%   BMI 37.50 kg/m²     Physical Exam   Constitutional: She is oriented to person, place, and time. She appears well-developed and well-nourished. No distress.   She is in a wheelchair on  nasal O2. General appearance is better than last time.    Eyes: Conjunctivae are normal. No scleral icterus.   Neck: No JVD present.   Cardiovascular: Normal rate and regular rhythm.  Exam reveals no gallop.    Murmur (3/6 systolic murmur both sternal borders) heard.  Pulmonary/Chest: Effort normal.   Musculoskeletal: She exhibits edema (2+).   Neurological: She is alert and oriented to  person, place, and time.   No residual neurological deficit.   Skin: Skin is warm and dry. She is not diaphoretic.   Psychiatric: She has a normal mood and affect. Her behavior is normal. Judgment and thought content normal.       Assessment:     1. Nonrheumatic aortic valve stenosis     2. Atherosclerosis of native coronary artery of native heart without angina pectoris     3. Essential hypertension, benign  COMP METABOLIC PANEL   4. Permanent atrial fibrillation (CMS-HCC)     5. Chronic anticoagulation     6. Chronic diastolic heart failure (CMS-HCC)       We don't have objective data on her recent left ventricular function for the reasons described in the last note. Clinically she certainly has had diastolic heart failure but the last documentation we have a preserved ejection fraction is from last year.  Heart rate and blood pressure are well controlled. Chest is clear today.  Medical Decision Making:  Today's Assessment / Status / Plan:   Continue the Lasix 80 mg daily but have a low threshold for increasing it. The next step would be an echocardiogram. She had her pro time drawn but is also due for follow-up chemistries. Apparently that was missed her last draw so I reordered them again today.  She sees Dr. Jon early next week.  I believe she has had recent thyroid function and CBC checked by Dr. Jon but if not then we should get that as well.

## 2017-09-21 NOTE — LETTER
[facility]   [address] [place] [address]  Phone: [phone]  Fax: [phone]              Sharron Bateman  10/23/1923    Encounter Date: 9/21/2017    Yong Corbett M.D.          PROGRESS NOTE:  Subjective:   Sharron Bateman is a 89 y.o. female who presents today For follow-up of adjustment of her Lasix therapy. The presumptive diagnosis is diastolic heart failure but higher dose Lasix did not seem to make any difference in her dyspnea which is actually been reasonably controllable on 80 mg daily and therefore that has been continued. Otherwise she's had no clinical change.    Past Medical History:   Diagnosis Date   • TIA involving right internal carotid artery 3/22/2016    Anticoagulation transiently subtherapeutic   • Left ventricular hypertrophy 2016   • Aortic stenosis 2016    RIGOBERTO=1.0cm2   • Mild mitral regurgitation 2016   • Pulmonary hypertension (CMS-[facility]) 2016    Moderate   • Shoulder dislocation 2016    Right, successfully reduced   • Presence of permanent cardiac pacemaker 4/22/2011    1. Pulse generator,  [employer] model #S203, serial  #[id number].  2. Right atrial lead,  [employer] model #4469, serial #[id number].  3. Right ventricular lead,  Guid[employer] model #4456, serial  #[id number].     • Myocardial infarct (CMS-HCC) 2009   • Coronary artery disease 2009    History of BMS to RCA & LAD in 9/2009   • Acquired hypothyroidism    • Arthritis     hands   • HLD (hyperlipidemia)    • Hypertension    • Long term (current) use of anticoagulants    • Nephrolithiasis    • Permanent atrial fibrillation (CMS-HCC)      Past Surgical History:   Procedure Laterality Date   • RECOVERY  4/21/2011    Performed by SURGERY, IR-RECOVERY at SURGERY SAME DAY [facility] ORS   • OTHER  2009    BMS to RCA & LAD    • OTHER      parathyroid removed   • OTHER ORTHOPEDIC SURGERY      left knee replacement   • PACEMAKER  INSERTION       Family History   Problem Relation Age of Onset   • Heart Disease Mother    • Heart Attack Father    • Heart Disease Father      CORONARY THROMBOSIS.     History   Smoking Status   • Never Smoker   Smokeless Tobacco   • Never Used     Allergies   Allergen Reactions   • Sulfa Drugs      Outpatient Encounter Prescriptions as of 9/21/2017   Medication Sig Dispense Refill   • furosemide (LASIX) 40 MG Tab Take 2 Tabs by mouth 2 times a day. 360 Tab 3   • atorvastatin (LIPITOR) 20 MG Tab Take 1 Tab by mouth every day. 90 Tab 3   • nystatin (MYCOSTATIN) powder Apply  to affected area(s) 4 times a day.     • lisinopril (PRINIVIL) 20 MG TABS Take 20 mg by mouth 2 times a day.     • metoprolol SR (TOPROL XL) 50 MG TB24 Take 50 mg by mouth every day.     • potassium chloride SA (K-DUR) 20 MEQ TBCR Take 20 mEq by mouth every day.     • warfarin (COUMADIN) 2 MG TABS Take 2 mg by mouth every day.     • aspirin 81 MG tablet Take 81 mg by mouth every day.       • amlodipine (NORVASC) 5 MG TABS Take 5 mg by mouth every day.       • Acetaminophen (TYLENOL ARTHRITIS EXT RELIEF PO) Take  by mouth.     • Calcium Carbonate-Vitamin D (CALCIUM 500 + D PO) Take  by mouth.       No facility-administered encounter medications on file as of 9/21/2017.      Review of Systems   HENT: Negative for nosebleeds.    Respiratory: Negative for hemoptysis.    Cardiovascular: Negative for chest pain.   Gastrointestinal: Negative for blood in stool and melena.   Genitourinary: Negative for hematuria.   Endo/Heme/Allergies: Does not bruise/bleed easily.        Objective:   /70   Pulse 70   Ht 1.524 m (5')   Wt 87.1 kg (192 lb)   SpO2 96%   BMI 37.50 kg/m²      Physical Exam   Constitutional: She is oriented to person, place, and time. She appears well-developed and well-nourished. No distress.   She is in a wheelchair on  nasal O2. General appearance is better than last time.    Eyes: Conjunctivae are normal. No scleral icterus.      Neck: No JVD present.   Cardiovascular: Normal rate and regular rhythm.  Exam reveals no gallop.    Murmur (3/6 systolic murmur both sternal borders) heard.  Pulmonary/Chest: Effort normal.   Musculoskeletal: She exhibits edema (2+).   Neurological: She is alert and oriented to person, place, and time.   No residual neurological deficit.   Skin: Skin is warm and dry. She is not diaphoretic.   Psychiatric: She has a normal mood and affect. Her behavior is normal. Judgment and thought content normal.       Assessment:     1. Nonrheumatic aortic valve stenosis     2. Atherosclerosis of native coronary artery of native heart without angina pectoris     3. Essential hypertension, benign  COMP METABOLIC PANEL   4. Permanent atrial fibrillation (CMS-HCC)     5. Chronic anticoagulation     6. Chronic diastolic heart failure (CMS-HCC)       We don't have objective data on her recent left ventricular function for the reasons described in the last note. Clinically she certainly has had diastolic heart failure but the last documentation we have a preserved ejection fraction is from last year.  Heart rate and blood pressure are well controlled. Chest is clear today.  Medical Decision Making:  Today's Assessment / Status / Plan:   Continue the Lasix 80 mg daily but have a low threshold for increasing it. The next step would be an echocardiogram. She had her pro time drawn but is also due for follow-up chemistries. Apparently that was missed her last draw so I reordered them again today.  She sees Dr. Jon early next week.  I believe she has had recent thyroid function and CBC checked by Dr. Jon but if not then we should get that as well.      Arabella Jon M.D.  [address] [facility] [id number]  VIA Facsimile: [phone]